# Patient Record
Sex: FEMALE | Race: WHITE | NOT HISPANIC OR LATINO | URBAN - METROPOLITAN AREA
[De-identification: names, ages, dates, MRNs, and addresses within clinical notes are randomized per-mention and may not be internally consistent; named-entity substitution may affect disease eponyms.]

---

## 2017-04-27 ENCOUNTER — OFFICE (OUTPATIENT)
Dept: URBAN - METROPOLITAN AREA CLINIC 10 | Facility: CLINIC | Age: 52
End: 2017-04-27
Payer: MEDICARE

## 2017-04-27 VITALS
HEIGHT: 61 IN | DIASTOLIC BLOOD PRESSURE: 75 MMHG | WEIGHT: 208 LBS | HEART RATE: 95 BPM | SYSTOLIC BLOOD PRESSURE: 140 MMHG

## 2017-04-27 DIAGNOSIS — E66.9 OBESITY, UNSPECIFIED: ICD-10-CM

## 2017-04-27 DIAGNOSIS — F17.200 NICOTINE DEPENDENCE, UNSPECIFIED, UNCOMPLICATED: ICD-10-CM

## 2017-04-27 DIAGNOSIS — M08.20: ICD-10-CM

## 2017-04-27 DIAGNOSIS — K59.00 CONSTIPATION, UNSPECIFIED: ICD-10-CM

## 2017-04-27 DIAGNOSIS — T50.Z95A: ICD-10-CM

## 2017-04-27 DIAGNOSIS — E11.9 TYPE 2 DIABETES MELLITUS WITHOUT COMPLICATIONS: ICD-10-CM

## 2017-04-27 DIAGNOSIS — R10.9 UNSPECIFIED ABDOMINAL PAIN: ICD-10-CM

## 2017-04-27 DIAGNOSIS — Z91.89 OTHER SPECIFIED PERSONAL RISK FACTORS, NOT ELSEWHERE CLASSIF: ICD-10-CM

## 2017-04-27 DIAGNOSIS — K21.9 GASTRO-ESOPHAGEAL REFLUX DISEASE WITHOUT ESOPHAGITIS: ICD-10-CM

## 2017-04-27 DIAGNOSIS — K76.0 FATTY (CHANGE OF) LIVER, NOT ELSEWHERE CLASSIFIED: ICD-10-CM

## 2017-04-27 DIAGNOSIS — R13.10 DYSPHAGIA, UNSPECIFIED: ICD-10-CM

## 2017-04-27 DIAGNOSIS — E11.43 TYPE 2 DIABETES MELLITUS WITH DIABETIC AUTONOMIC (POLY)NEURO: ICD-10-CM

## 2017-04-27 PROCEDURE — G8427 DOCREV CUR MEDS BY ELIG CLIN: HCPCS

## 2017-04-27 PROCEDURE — 99214 OFFICE O/P EST MOD 30 MIN: CPT

## 2017-04-27 RX ORDER — LACTULOSE 10 G/15ML
SOLUTION ORAL
Qty: 900 | Refills: 11 | Status: ACTIVE
Start: 2015-09-28

## 2017-04-28 LAB
ALPHA-FETOPROTEIN TUMOR MARKER: AFP - TUMOR MARKER: 6.4 NG/ML (ref 0–9)
AMMONIA: AMMONIA,BLOOD: 34 UMOL/L (ref 11–51)
CBCI COMPLETE BLOOD COUNT W/ DIFF: ABS BASOPHILS: 0.1 K/UL (ref 0–0.3)
CBCI COMPLETE BLOOD COUNT W/ DIFF: ABS EOSINOPHILS: 0.3 K/UL (ref 0.05–0.5)
CBCI COMPLETE BLOOD COUNT W/ DIFF: ABS LYMPHOCYTES: 3 K/UL (ref 1–4)
CBCI COMPLETE BLOOD COUNT W/ DIFF: ABS MONOCYTES: 1.4 K/UL — HIGH (ref 0.1–1.1)
CBCI COMPLETE BLOOD COUNT W/ DIFF: ABS NEUTROPHILS: 9.5 K/UL — HIGH (ref 1.8–7)
CBCI COMPLETE BLOOD COUNT W/ DIFF: BASOPHILS: 1 % (ref 0–3)
CBCI COMPLETE BLOOD COUNT W/ DIFF: EOSINOPHILS: 2 % (ref 1–7)
CBCI COMPLETE BLOOD COUNT W/ DIFF: HEMATOCRIT: 38.8 % (ref 36–48)
CBCI COMPLETE BLOOD COUNT W/ DIFF: HEMOGLOBIN: 12.3 G/DL (ref 12–16)
CBCI COMPLETE BLOOD COUNT W/ DIFF: LYMPHOCYTES: 21 % (ref 20–48)
CBCI COMPLETE BLOOD COUNT W/ DIFF: MCH: 24.6 PG — LOW (ref 25–35)
CBCI COMPLETE BLOOD COUNT W/ DIFF: MCHC: 31.7 % (ref 30–38)
CBCI COMPLETE BLOOD COUNT W/ DIFF: MCV: 77.8 FL — LOW (ref 78–102)
CBCI COMPLETE BLOOD COUNT W/ DIFF: MONOCYTES: 10 % (ref 3–14)
CBCI COMPLETE BLOOD COUNT W/ DIFF: NEUTROPHILS: 66 % (ref 40–70)
CBCI COMPLETE BLOOD COUNT W/ DIFF: PLATELET COUNT: 432 K/UL (ref 150–450)
CBCI COMPLETE BLOOD COUNT W/ DIFF: PLT MORPHOLOGY: (no result)
CBCI COMPLETE BLOOD COUNT W/ DIFF: RBC DISTRIBUTION WIDTH: 25.6 % — HIGH (ref 11.5–16)
CBCI COMPLETE BLOOD COUNT W/ DIFF: RBC MORPH: (no result)
CBCI COMPLETE BLOOD COUNT W/ DIFF: RED BLOOD CELL COUNT: 4.99 M/UL (ref 4–5.5)
CBCI COMPLETE BLOOD COUNT W/ DIFF: WHITE BLOOD CELL COUNT: 14.4 K/UL — HIGH (ref 4–11)
COMPREHENSIVE METABOLIC PANEL: ALBUMIN: 4.7 G/DL (ref 3.5–5.2)
COMPREHENSIVE METABOLIC PANEL: ALKALINE PHOSPHATASE: 115 U/L — HIGH (ref 38–111)
COMPREHENSIVE METABOLIC PANEL: CALCIUM TOTAL: 9.9 MG/DL (ref 8.5–10.5)
COMPREHENSIVE METABOLIC PANEL: CARBON DIOXIDE: 29 MEQ/L (ref 21–31)
COMPREHENSIVE METABOLIC PANEL: CHLORIDE: 97 MEQ/L (ref 96–106)
COMPREHENSIVE METABOLIC PANEL: CREATININE: 0.79 MG/DL (ref 0.6–1.3)
COMPREHENSIVE METABOLIC PANEL: FASTING/NON-FASTING: (no result)
COMPREHENSIVE METABOLIC PANEL: GLUCOSE: 89 MG/DL (ref 65–100)
COMPREHENSIVE METABOLIC PANEL: POTASSIUM: 4.4 MEQ/ML (ref 3.5–5.4)
COMPREHENSIVE METABOLIC PANEL: SGOT (AST): 19 U/L (ref 13–40)
COMPREHENSIVE METABOLIC PANEL: SGPT (ALT): 23 U/L (ref 7–52)
COMPREHENSIVE METABOLIC PANEL: SODIUM: 142 MEQ/L (ref 135–145)
COMPREHENSIVE METABOLIC PANEL: TOTAL BILIRUBIN: 0.2 MG/DL — LOW (ref 0.3–1.2)
COMPREHENSIVE METABOLIC PANEL: TOTAL PROTEIN: 6.9 G/DL (ref 6.4–8.3)
COMPREHENSIVE METABOLIC PANEL: UREA NITROGEN: 10 MG/DL (ref 6–20)
FERRITIN: 22 NG/ML (ref 13–200)
IRON & TIBC: % SATURATION: 5 % — LOW (ref 20–50)
IRON & TIBC: IRON: 19 UG/DL — LOW (ref 37–145)
IRON & TIBC: TOTAL IRON BINDING: 374 UG/DL — HIGH (ref 112–347)
PROTHROMBIN TIME: INR VALUE: 0.93
PROTHROMBIN TIME: MEAN NORMAL: 13.1 SECONDS
PROTHROMBIN TIME: PATIENT: 12.4 SECONDS (ref 12.1–14.2)

## 2017-05-16 ENCOUNTER — AMBULATORY SURGICAL CENTER (OUTPATIENT)
Dept: URBAN - METROPOLITAN AREA SURGERY 1 | Facility: SURGERY | Age: 52
End: 2017-05-16

## 2017-05-16 ENCOUNTER — OFFICE (OUTPATIENT)
Dept: URBAN - METROPOLITAN AREA CLINIC 11 | Facility: CLINIC | Age: 52
End: 2017-05-16

## 2017-05-16 ENCOUNTER — AMBULATORY SURGICAL CENTER (OUTPATIENT)
Dept: URBAN - METROPOLITAN AREA SURGERY 1 | Facility: SURGERY | Age: 52
End: 2017-05-16
Payer: MEDICARE

## 2017-05-16 VITALS
DIASTOLIC BLOOD PRESSURE: 87 MMHG | OXYGEN SATURATION: 93 % | OXYGEN SATURATION: 98 % | RESPIRATION RATE: 20 BRPM | RESPIRATION RATE: 17 BRPM | SYSTOLIC BLOOD PRESSURE: 139 MMHG | OXYGEN SATURATION: 95 % | SYSTOLIC BLOOD PRESSURE: 121 MMHG | DIASTOLIC BLOOD PRESSURE: 84 MMHG | HEIGHT: 61 IN | SYSTOLIC BLOOD PRESSURE: 129 MMHG | SYSTOLIC BLOOD PRESSURE: 139 MMHG | WEIGHT: 211 LBS | HEART RATE: 99 BPM | HEIGHT: 61 IN | DIASTOLIC BLOOD PRESSURE: 84 MMHG | TEMPERATURE: 98.9 F | DIASTOLIC BLOOD PRESSURE: 62 MMHG | RESPIRATION RATE: 22 BRPM | SYSTOLIC BLOOD PRESSURE: 113 MMHG | SYSTOLIC BLOOD PRESSURE: 109 MMHG | TEMPERATURE: 98.9 F | DIASTOLIC BLOOD PRESSURE: 57 MMHG | SYSTOLIC BLOOD PRESSURE: 113 MMHG | SYSTOLIC BLOOD PRESSURE: 121 MMHG | HEART RATE: 99 BPM | OXYGEN SATURATION: 98 % | HEART RATE: 86 BPM | HEART RATE: 92 BPM | DIASTOLIC BLOOD PRESSURE: 77 MMHG | RESPIRATION RATE: 14 BRPM | RESPIRATION RATE: 22 BRPM | OXYGEN SATURATION: 96 % | RESPIRATION RATE: 14 BRPM | RESPIRATION RATE: 20 BRPM | TEMPERATURE: 99 F | WEIGHT: 211 LBS | DIASTOLIC BLOOD PRESSURE: 87 MMHG | HEART RATE: 92 BPM | RESPIRATION RATE: 18 BRPM | SYSTOLIC BLOOD PRESSURE: 109 MMHG | TEMPERATURE: 99 F | SYSTOLIC BLOOD PRESSURE: 129 MMHG | HEART RATE: 91 BPM | RESPIRATION RATE: 18 BRPM | DIASTOLIC BLOOD PRESSURE: 57 MMHG | HEART RATE: 91 BPM | DIASTOLIC BLOOD PRESSURE: 77 MMHG | HEART RATE: 86 BPM | OXYGEN SATURATION: 96 % | DIASTOLIC BLOOD PRESSURE: 62 MMHG | OXYGEN SATURATION: 95 % | RESPIRATION RATE: 17 BRPM | OXYGEN SATURATION: 93 %

## 2017-05-16 DIAGNOSIS — K31.89 OTHER DISEASES OF STOMACH AND DUODENUM: ICD-10-CM

## 2017-05-16 DIAGNOSIS — E11.43 TYPE 2 DIABETES MELLITUS WITH DIABETIC AUTONOMIC (POLY)NEURO: ICD-10-CM

## 2017-05-16 DIAGNOSIS — K29.60 OTHER GASTRITIS WITHOUT BLEEDING: ICD-10-CM

## 2017-05-16 DIAGNOSIS — R10.9 UNSPECIFIED ABDOMINAL PAIN: ICD-10-CM

## 2017-05-16 DIAGNOSIS — M06.9 RHEUMATOID ARTHRITIS, UNSPECIFIED: ICD-10-CM

## 2017-05-16 DIAGNOSIS — T18.2XXA FOREIGN BODY IN STOMACH, INITIAL ENCOUNTER: ICD-10-CM

## 2017-05-16 DIAGNOSIS — K21.9 GASTRO-ESOPHAGEAL REFLUX DISEASE WITHOUT ESOPHAGITIS: ICD-10-CM

## 2017-05-16 PROCEDURE — 88313 SPECIAL STAINS GROUP 2: CPT | Performed by: INTERNAL MEDICINE

## 2017-05-16 PROCEDURE — G8907 PT DOC NO EVENTS ON DISCHARG: HCPCS | Performed by: INTERNAL MEDICINE

## 2017-05-16 PROCEDURE — 43239 EGD BIOPSY SINGLE/MULTIPLE: CPT | Performed by: INTERNAL MEDICINE

## 2017-05-16 PROCEDURE — 88312 SPECIAL STAINS GROUP 1: CPT | Performed by: INTERNAL MEDICINE

## 2017-05-16 PROCEDURE — 88305 TISSUE EXAM BY PATHOLOGIST: CPT | Performed by: INTERNAL MEDICINE

## 2017-05-16 PROCEDURE — G8918 PT W/O PREOP ORDER IV AB PRO: HCPCS | Performed by: INTERNAL MEDICINE

## 2017-05-16 PROCEDURE — 88342 IMHCHEM/IMCYTCHM 1ST ANTB: CPT | Performed by: INTERNAL MEDICINE

## 2017-05-16 RX ORDER — METOCLOPRAMIDE HYDROCHLORIDE 5 MG/1
TABLET ORAL
Qty: 90 | Refills: 0 | Status: ACTIVE
Start: 2017-05-16

## 2017-05-16 RX ORDER — NIZATIDINE 300 MG/1
CAPSULE ORAL
Qty: 90 | Refills: 3 | Status: ACTIVE
Start: 2017-05-16

## 2017-12-28 ENCOUNTER — OFFICE (OUTPATIENT)
Dept: URBAN - METROPOLITAN AREA CLINIC 10 | Facility: CLINIC | Age: 52
End: 2017-12-28
Payer: MEDICARE

## 2017-12-28 VITALS
SYSTOLIC BLOOD PRESSURE: 119 MMHG | DIASTOLIC BLOOD PRESSURE: 67 MMHG | WEIGHT: 214 LBS | HEIGHT: 61 IN | HEART RATE: 88 BPM

## 2017-12-28 DIAGNOSIS — M06.9 RHEUMATOID ARTHRITIS, UNSPECIFIED: ICD-10-CM

## 2017-12-28 DIAGNOSIS — R13.19 OTHER DYSPHAGIA: ICD-10-CM

## 2017-12-28 DIAGNOSIS — E11.43 TYPE 2 DIABETES MELLITUS WITH DIABETIC AUTONOMIC (POLY)NEURO: ICD-10-CM

## 2017-12-28 DIAGNOSIS — Z91.89 OTHER SPECIFIED PERSONAL RISK FACTORS, NOT ELSEWHERE CLASSIF: ICD-10-CM

## 2017-12-28 DIAGNOSIS — E66.9 OBESITY, UNSPECIFIED: ICD-10-CM

## 2017-12-28 DIAGNOSIS — R10.9 UNSPECIFIED ABDOMINAL PAIN: ICD-10-CM

## 2017-12-28 PROCEDURE — G8427 DOCREV CUR MEDS BY ELIG CLIN: HCPCS

## 2017-12-28 PROCEDURE — 99214 OFFICE O/P EST MOD 30 MIN: CPT

## 2017-12-28 RX ORDER — SUCRALFATE 1 G/1
TABLET ORAL
Qty: 120 | Refills: 3 | Status: ACTIVE
Start: 2017-12-28

## 2017-12-28 NOTE — SERVICEHPINOTES
Ariella Guerra   is a  52   year old   female   who is here today for a follow-up visit. She was last seen here on  5/16/2017   for a  EGD  .    She is in the office for follow up abdominal pain. She is still complaining of pain to left lower quadrant and notice slight bulge at times. Pain is not related to eating. She is unable to tell if positional. She is recovering from back surgery that she had in November.In the past she had some issues with Candida Esophagitis, She is complaining of burning sensation to throat. Also, complaining of dysphagia and mostly with solids. She had EGD in May and history of gastroparesis and DM. She is taking reglan and on omeprazole and Zantac. She has chronic issues with constipation and taking pain medication daily. She is on lactulose daily.NO diarrhea, melena, or hematochezia. Appetite is fair. Weight is stable.

## 2023-11-30 ENCOUNTER — OFFICE (OUTPATIENT)
Dept: URBAN - METROPOLITAN AREA CLINIC 10 | Facility: CLINIC | Age: 58
End: 2023-11-30

## 2023-11-30 VITALS
HEIGHT: 61 IN | HEART RATE: 79 BPM | OXYGEN SATURATION: 95 % | DIASTOLIC BLOOD PRESSURE: 90 MMHG | WEIGHT: 151 LBS | SYSTOLIC BLOOD PRESSURE: 136 MMHG

## 2023-11-30 DIAGNOSIS — K92.1 MELENA: ICD-10-CM

## 2023-11-30 DIAGNOSIS — K64.4 RESIDUAL HEMORRHOIDAL SKIN TAGS: ICD-10-CM

## 2023-11-30 PROCEDURE — 99203 OFFICE O/P NEW LOW 30 MIN: CPT | Performed by: INTERNAL MEDICINE

## 2023-11-30 RX ORDER — POLYETHYLENE GLYCOL 3350, SODIUM SULFATE, SODIUM CHLORIDE, POTASSIUM CHLORIDE, ASCORBIC ACID, SODIUM ASCORBATE 140-9-5.2G
KIT ORAL
Qty: 1 | Refills: 0 | Status: COMPLETED
Start: 2023-11-30 | End: 2024-02-06

## 2023-11-30 RX ORDER — HYDROCORTISONE ACETATE 25 MG/1
SUPPOSITORY RECTAL
Qty: 7 | Refills: 0 | Status: ACTIVE
Start: 2023-11-30

## 2024-02-06 ENCOUNTER — AMBULATORY SURGICAL CENTER (OUTPATIENT)
Dept: URBAN - METROPOLITAN AREA SURGERY 1 | Facility: SURGERY | Age: 59
End: 2024-02-06

## 2024-02-06 ENCOUNTER — OFFICE (OUTPATIENT)
Dept: URBAN - METROPOLITAN AREA PATHOLOGY 12 | Facility: PATHOLOGY | Age: 59
End: 2024-02-06

## 2024-02-06 VITALS
TEMPERATURE: 98.2 F | DIASTOLIC BLOOD PRESSURE: 82 MMHG | DIASTOLIC BLOOD PRESSURE: 67 MMHG | HEIGHT: 61 IN | HEART RATE: 69 BPM | TEMPERATURE: 98.3 F | SYSTOLIC BLOOD PRESSURE: 83 MMHG | HEART RATE: 79 BPM | HEART RATE: 69 BPM | DIASTOLIC BLOOD PRESSURE: 60 MMHG | TEMPERATURE: 98.3 F | RESPIRATION RATE: 18 BRPM | OXYGEN SATURATION: 100 % | DIASTOLIC BLOOD PRESSURE: 68 MMHG | SYSTOLIC BLOOD PRESSURE: 101 MMHG | SYSTOLIC BLOOD PRESSURE: 98 MMHG | RESPIRATION RATE: 18 BRPM | RESPIRATION RATE: 19 BRPM | DIASTOLIC BLOOD PRESSURE: 82 MMHG | SYSTOLIC BLOOD PRESSURE: 98 MMHG | RESPIRATION RATE: 16 BRPM | SYSTOLIC BLOOD PRESSURE: 124 MMHG | TEMPERATURE: 98.2 F | SYSTOLIC BLOOD PRESSURE: 101 MMHG | SYSTOLIC BLOOD PRESSURE: 105 MMHG | HEART RATE: 61 BPM | OXYGEN SATURATION: 100 % | DIASTOLIC BLOOD PRESSURE: 43 MMHG | SYSTOLIC BLOOD PRESSURE: 124 MMHG | WEIGHT: 145 LBS | HEART RATE: 65 BPM | RESPIRATION RATE: 16 BRPM | SYSTOLIC BLOOD PRESSURE: 105 MMHG | WEIGHT: 145 LBS | DIASTOLIC BLOOD PRESSURE: 67 MMHG | HEART RATE: 79 BPM | DIASTOLIC BLOOD PRESSURE: 60 MMHG | DIASTOLIC BLOOD PRESSURE: 68 MMHG | RESPIRATION RATE: 19 BRPM | DIASTOLIC BLOOD PRESSURE: 43 MMHG | HEART RATE: 65 BPM | HEIGHT: 61 IN | SYSTOLIC BLOOD PRESSURE: 83 MMHG | HEART RATE: 61 BPM

## 2024-02-06 DIAGNOSIS — D12.2 BENIGN NEOPLASM OF ASCENDING COLON: ICD-10-CM

## 2024-02-06 DIAGNOSIS — K92.1 MELENA: ICD-10-CM

## 2024-02-06 PROBLEM — K63.5 POLYP OF COLON: Status: ACTIVE | Noted: 2024-02-06

## 2024-02-06 PROCEDURE — 45380 COLONOSCOPY AND BIOPSY: CPT | Performed by: INTERNAL MEDICINE

## 2024-02-06 PROCEDURE — 88305 TISSUE EXAM BY PATHOLOGIST: CPT | Performed by: STUDENT IN AN ORGANIZED HEALTH CARE EDUCATION/TRAINING PROGRAM

## 2024-02-08 LAB
GASTRO ONE PATHOLOGY: PDF REPORT: (no result)
GASTRO ONE PATHOLOGY: PDF REPORT: (no result)

## 2025-08-05 ENCOUNTER — OFFICE (OUTPATIENT)
Dept: URBAN - METROPOLITAN AREA CLINIC 10 | Facility: CLINIC | Age: 60
End: 2025-08-05
Payer: MEDICARE

## 2025-08-05 VITALS
SYSTOLIC BLOOD PRESSURE: 117 MMHG | WEIGHT: 150 LBS | HEART RATE: 65 BPM | HEIGHT: 61 IN | OXYGEN SATURATION: 98 % | DIASTOLIC BLOOD PRESSURE: 76 MMHG

## 2025-08-05 DIAGNOSIS — K62.5 HEMORRHAGE OF ANUS AND RECTUM: ICD-10-CM

## 2025-08-05 DIAGNOSIS — K64.4 RESIDUAL HEMORRHOIDAL SKIN TAGS: ICD-10-CM

## 2025-08-05 PROCEDURE — 99214 OFFICE O/P EST MOD 30 MIN: CPT

## 2025-08-05 RX ORDER — HYDROCORTISONE ACETATE 25 MG/1
SUPPOSITORY RECTAL
Qty: 14 | Refills: 0 | Status: ACTIVE
Start: 2025-08-05

## 2025-08-05 NOTE — SERVICEHPINOTES
Mrs. Guerra is a 60 year old white female presenting with  external hemorrhoids.  She states she has had issues with hemorrhoids for several years.  She is having bright red blood when she wipes with most bowel movements most every day.  She states she has had bleeding with bowel movements for years.   She reports having a bowel movement daily. She has tried over-the-counter creams and prescription creams with little relief.   What has helped her the most is the Anusol suppositories. She is interested in a surgical referral.  She denies rectal pain, abdominal pain, constipation, diarrhea, unintentional weight loss.  Her last colonoscopy was February 2024.  this showed external hemorrhoids and 1 tubular adenoma polyp was removed.